# Patient Record
(demographics unavailable — no encounter records)

---

## 2025-01-22 NOTE — ASSESSMENT
[FreeTextEntry1] : 66 y/o female for follow up after 1 year  Healed well Advised patient to follow up as needed in the future. Advised patient to commit to silicone scar care twice a day for three months. All questions and concerns addressed. Plan and patient status as per Dr Hillman.

## 2025-01-22 NOTE — REASON FOR VISIT
[Follow-Up: _____] : a [unfilled] follow-up visit [FreeTextEntry1] : Patient had her mohs procedure done 1 year ago, states she is very happy with the results and has no complaints at this time.

## 2025-01-22 NOTE — PHYSICAL EXAM
[NI] : Normal [de-identified] : Right nasal ala flap is healed well, soft and not as thick as last visit.

## 2025-01-22 NOTE — HISTORY OF PRESENT ILLNESS
[FreeTextEntry1] : Patient presents for follow up and states that she is happy with the results of her Mohs reconstruction of her nose.  She states that she had used silicone gel for a little while, but in the summer, she sweats, and it would just come off.  She will try it again in the winter.  She states that she has been following up with her dermatologist and has no other skin cancers to report at this time.

## 2025-01-22 NOTE — PHYSICAL EXAM
[NI] : Normal [de-identified] : Right nasal ala flap is healed well, soft and not as thick as last visit.